# Patient Record
Sex: MALE | NOT HISPANIC OR LATINO | Employment: FULL TIME | URBAN - METROPOLITAN AREA
[De-identification: names, ages, dates, MRNs, and addresses within clinical notes are randomized per-mention and may not be internally consistent; named-entity substitution may affect disease eponyms.]

---

## 2020-01-10 ENCOUNTER — TRANSCRIBE ORDERS (OUTPATIENT)
Dept: PHYSICAL THERAPY | Facility: CLINIC | Age: 35
End: 2020-01-10

## 2020-01-10 DIAGNOSIS — S56.911A FOREARM STRAIN, RIGHT, INITIAL ENCOUNTER: Primary | ICD-10-CM

## 2020-01-10 DIAGNOSIS — S66.911A STRAIN OF RIGHT WRIST, INITIAL ENCOUNTER: ICD-10-CM

## 2020-01-15 ENCOUNTER — TREATMENT (OUTPATIENT)
Dept: PHYSICAL THERAPY | Facility: CLINIC | Age: 35
End: 2020-01-15

## 2020-01-15 DIAGNOSIS — M25.531 RIGHT WRIST PAIN: Primary | ICD-10-CM

## 2020-01-15 PROCEDURE — 97035 APP MDLTY 1+ULTRASOUND EA 15: CPT | Performed by: PHYSICAL THERAPIST

## 2020-01-15 PROCEDURE — 97014 ELECTRIC STIMULATION THERAPY: CPT | Performed by: PHYSICAL THERAPIST

## 2020-01-15 PROCEDURE — 97162 PT EVAL MOD COMPLEX 30 MIN: CPT | Performed by: PHYSICAL THERAPIST

## 2020-01-15 PROCEDURE — 97110 THERAPEUTIC EXERCISES: CPT | Performed by: PHYSICAL THERAPIST

## 2020-01-15 NOTE — PROGRESS NOTES
Physical Therapy Initial Evaluation and Plan of Care    TOTAL TIME: 90 MINUTES    Subjective Evaluation    History of Present Illness  Mechanism of injury: Patient reports right wrist pain that started at work about one month ago; does not recall any specific incident     Points to ulnar side of wrist as well as the snuffbox area on the lateral side    Complains of swelling in right wrist    Uses ibuprofen and gentle massages at home ; no heat or ice       Brace helps some    Hurts most to ulnarly deviate or /lift     PMH:   Knee pain, osgood-schlaters, meniscus injury, arthritis in knees, right side low back injury, headaches      Quality of life: fair    Pain  Current pain ratin  At worst pain ratin  Quality: dull ache and sharp  Relieving factors: medications  Aggravating factors: movement, lifting and repetitive movement  Progression: no change    Patient Goals  Patient goals for therapy: increased strength, independence with ADLs/IADLs, return to sport/leisure activities, return to work, increased motion, decreased pain and decreased edema             Objective       Observations     Right Wrist/Hand   Positive for edema.     Additional Observation Details  At wrist, just proximal to ulna: left 17.5 cm, right 18.5 cm  5 cm proximal to ulna: left 20 cm, right 21 cm        Palpation     Right Tenderness of the extensor carpi ulnaris and flexor carpi ulnaris.     Tenderness     Right Elbow   No tenderness in the distal biceps tendon, distal triceps tendon, lateral epicondyle, medial epicondyle and olecranon process.     Right Wrist/Hand   Tenderness in the sixth dorsal compartment and triangular fibrocartilage complex . Tenderness in the common extensor tendon, distal biceps tendon, distal triceps tendon, lateral epicondyle, medial epicondyle and olecranon process.     Additional Tenderness Details  Tender to palpate: right pec major/minor, right cervical paraspinals, cervical spinous processes , right  upper trapezius, supraspinatus     Neurological Testing     Sensation     Wrist/Hand   Left   Intact: light touch    Right   Intact: light touch  Paresthesia: light touch    Comments   Right light touch: c/o intermittent tingling in right wrist on ulnar side    Reflexes   Left   Biceps (C5/C6): trace (1+)  Brachioradialis (C6): trace (1+)  Triceps (C7): trace (1+)    Right   Biceps (C5/C6): trace (1+)  Brachioradialis (C6): trace (1+)  Triceps (C7): trace (1+)    Additional Neurological Details  Reflexes equal bilaterally     Active Range of Motion     Right Wrist   Wrist flexion: with pain  Wrist extension: with pain  Radial deviation: with pain  Ulnar deviation: with pain    Additional Active Range of Motion Details  ROM of right wrist limited ~ 50% all directions, due to pain along the ulnar side of forearm/ wrist     Strength/Myotome Testing     Left Wrist/Hand      (2nd hand position)     Trial 1: 90 lbs    Trial 2: 90 lbs    Trial 3: 90 lbs    Average: 90 lbs    Right Wrist/Hand   Wrist extension: 3-  Wrist flexion: 3-  Radial deviation: 3-  Ulnar deviation: 3-     (2nd hand position)     Trial 1: 30 lbs    Trial 2: 30 lbs    Trial 3: 30 lbs    Average: 30 lbs    Additional Strength Details  Rotator cuff strength WFL    Tests     Right Wrist/Hand   Positive Finkelstein's, Phalen's sign and TFCC load.   Negative Tinel's sign (medial nerve).     Additional Tests Details  + cervical compression test   + right O'Briens test  + Neer impingement test     General Comments     Wrist/Hand Comments  Tight/sore band of tissue along midline of cervical spine, possibly ligamentum flava, nuchal ligament, etc  States headaches are usually anterior or frontal          Assessment & Plan     Assessment  Impairments: abnormal muscle tone, abnormal or restricted ROM, activity intolerance, impaired physical strength, lacks appropriate home exercise program and pain with function  Assessment details: Patient presents today  with c/o right wrist pain predominately for ~ 1 month; upon examination patient also has significant dysfunction with right shoulder and right>left side of cervical spine;  is significantly decreased on the right (90# left, 30# right with pain), pain on right side of neck with all neck ROM except flexion; positive cervical compression test; reflexes equal bilaterally; c/o numbness and tingling intermittently in the right ulnar/dorsal wrist; positive Washington's test and pain with ADD and flexion of shoulder; limited forward flexion to ~ 120 degrees; RTC strength good and negative empty can test; patient states he had a work injury ~ 2 years ago to right side of lumbar spine which still gives him pain - patient states he feels like his right sided neck pain started hurting about the same time as that; patient has tenderness to palpate midline and right side of cervical spine ; taut band of tissue in midline cervical spine ; has difficulty sleeping especially on the right side; wrist pain is predominately on the ulnar side of wrist just proximal and medial to ulnar styloid process; he has one cm of edema at wrist and 5 cm proximal to wrist right compared to left ; he feels like the brace helps the wrist; is working on light duty, pulls back from duties that are causing pain; only mildly positive Finkelstein test today, primary complaints of wrist pain are on ulnar side of right wrist; differential diagnosis of right wrist sprain vs possible proximal component of cervical radiculitis chronic in nature; patient will perform physical therapy for manual therapy, modalities, therapeutic exercise in order to decrease pain and improve functional ROM and strength in order to RTW on full duty without pain or dysfunction   Prognosis: fair  Functional Limitations: carrying objects, lifting, sleeping, pulling, pushing, uncomfortable because of pain, moving in bed, reaching behind back, reaching overhead and unable to perform  repetitive tasks  Goals  Plan Goals: 4 weeks:  1. IND with HEP  2. Patient to display full, pain-free ROM of right wrist all planes  3. Patient to display right  strength > 75# without pain  4. Patient to display 5/5 strength of right UE in order to perform work duties without pain or dysfunction  5. Patient to have 50% reduction in complaints of cervical spine pain with improved ROM to WFL    Plan  Therapy options: will be seen for skilled physical therapy services  Planned modality interventions: iontophoresis, TENS, thermotherapy (hydrocollator packs), traction, ultrasound, high voltage pulsed current (pain management), electrical stimulation/Russian stimulation and cryotherapy  Planned therapy interventions: manual therapy, neuromuscular re-education, postural training, soft tissue mobilization, spinal/joint mobilization, strengthening, stretching, therapeutic activities, joint mobilization, home exercise program, functional ROM exercises, flexibility, fine motor coordination training, body mechanics training and balance/weight-bearing training  Frequency: 2x week  Duration in visits: 8  Treatment plan discussed with: patient        Manual Therapy:         mins  33163;  Therapeutic Exercise:    15     mins  85030;     Neuromuscular Berlin:        mins  97532;    Therapeutic Activity:          mins  52196;     Gait Training:           mins  00170;     Ultrasound:     10     mins  50602;    Electrical Stimulation:    15     mins  00758 ( );  Dry Needling          mins self-pay    Timed Treatment:   40   mins   Total Treatment:     90   mins    PT SIGNATURE: Dell Musa PT   DATE TREATMENT INITIATED: 1/15/2020    Initial Certification  Certification Period: 4/14/2020  I certify that the therapy services are furnished while this patient is under my care.  The services outlined above are required by this patient, and will be reviewed every 90 days.     PHYSICIAN: Ehsan Henriquez MD      DATE:      Please sign and return via fax to  .. Thank you, [facility].

## 2020-01-22 ENCOUNTER — TREATMENT (OUTPATIENT)
Dept: PHYSICAL THERAPY | Facility: CLINIC | Age: 35
End: 2020-01-22

## 2020-01-22 DIAGNOSIS — M25.531 RIGHT WRIST PAIN: Primary | ICD-10-CM

## 2020-01-22 PROCEDURE — 97110 THERAPEUTIC EXERCISES: CPT | Performed by: PHYSICAL THERAPIST

## 2020-01-22 PROCEDURE — 97014 ELECTRIC STIMULATION THERAPY: CPT | Performed by: PHYSICAL THERAPIST

## 2020-01-22 NOTE — PROGRESS NOTES
Physical Therapy Daily Progress Note    TOTAL TIME: 60 MINUTES    Zenon Sparrow reports: feels a whole lot better since that first PT session; hasn't really hurt much since then, occasional swelling in thumb area, has not hurt on medial side of wrist since last session      Objective   See Exercise, Manual, and Modality Logs for complete treatment.     THERAPEUTIC EXERCISES/ACTIVITIES ADDED TODAY: putty, digi web series, digi , digitube series    Ice with pre-mod estim to snuffbox area x 15 min    Assessment/Plan  PATIENT NEEDS CONTINUED PHYSICAL THERAPY IN ORDER TO ACHIEVE FULL ROM, STRENGTH AND FUNCTION WITH NO REPORTS OF PAIN IN ORDER TO RTW WITHOUT RESTRICTIONS AND WITHOUT PAIN OR DYSFUNCTION       Progress per Plan of Care           Manual Therapy:         mins  53327;  Therapeutic Exercise:    45     mins  43762;     Neuromuscular Berlin:        mins  49681;    Therapeutic Activity:          mins  11331;     Gait Training:           mins  15397;     Ultrasound:          mins  81770;    Electrical Stimulation:    15     mins  07038 ( );  Dry Needling          mins self-pay    Timed Treatment:   60   mins   Total Treatment:     60   mins    Dell Musa PT  Physical Therapist

## 2020-01-29 ENCOUNTER — TREATMENT (OUTPATIENT)
Dept: PHYSICAL THERAPY | Facility: CLINIC | Age: 35
End: 2020-01-29

## 2020-01-29 DIAGNOSIS — M25.531 RIGHT WRIST PAIN: Primary | ICD-10-CM

## 2020-01-29 PROCEDURE — 97014 ELECTRIC STIMULATION THERAPY: CPT | Performed by: PHYSICAL THERAPIST

## 2020-01-29 PROCEDURE — 97110 THERAPEUTIC EXERCISES: CPT | Performed by: PHYSICAL THERAPIST

## 2020-01-31 ENCOUNTER — TREATMENT (OUTPATIENT)
Dept: PHYSICAL THERAPY | Facility: CLINIC | Age: 35
End: 2020-01-31

## 2020-01-31 DIAGNOSIS — M25.531 RIGHT WRIST PAIN: Primary | ICD-10-CM

## 2020-01-31 PROCEDURE — 97110 THERAPEUTIC EXERCISES: CPT | Performed by: PHYSICAL THERAPIST

## 2020-01-31 NOTE — PROGRESS NOTES
Physical Therapy Daily Progress Note    TOTAL TIME: 80 MINUTES    Zenon Cross reports: wrist is feeling better, neck is feeling better as well       Objective   See Exercise, Manual, and Modality Logs for complete treatment.     THERAPEUTIC EXERCISES/ACTIVITIES ADDED TODAY: see flow sheets     Assessment/Plan  PATIENT NEEDS CONTINUED PHYSICAL THERAPY IN ORDER TO ACHIEVE FULL ROM, STRENGTH AND FUNCTION WITH NO REPORTS OF PAIN IN ORDER TO RTW WITHOUT RESTRICTIONS AND WITHOUT PAIN OR DYSFUNCTION ; MILD PAIN WITH BLUE DIGI TUBE TWISTING AND BENDING      Progress per Plan of Care           Manual Therapy:         mins  38142;  Therapeutic Exercise:    65     mins  28591;     Neuromuscular Berlin:        mins  48516;    Therapeutic Activity:          mins  56093;     Gait Training:           mins  47643;     Ultrasound:          mins  16992;    Electrical Stimulation:    15     mins  51364 ( );  Dry Needling          mins self-pay    Timed Treatment:   80   mins   Total Treatment:     80   mins    Dell Musa PT  Physical Therapist

## 2020-02-04 NOTE — PROGRESS NOTES
Physical Therapy Daily Progress Note    TOTAL TIME: 70 MINUTES    Zenon Cross reports: feeling a whole lot better, neck hurts less, too      Objective   See Exercise, Manual, and Modality Logs for complete treatment.     THERAPEUTIC EXERCISES/ACTIVITIES ADDED TODAY: see flow sheets ; blue digi tube, black digi web    Assessment/Plan  Some mild wrist pain with twisting, otherwise patient is doing much better overall    Progress per Plan of Care and Progress strengthening /stabilization /functional activity           Manual Therapy:         mins  26059;  Therapeutic Exercise:    55     mins  85649;     Neuromuscular Berlin:        mins  84631;    Therapeutic Activity:          mins  73126;     Gait Training:           mins  24347;     Ultrasound:          mins  21118;    Electrical Stimulation:    15     mins  09393 ( );  Dry Needling          mins self-pay    Timed Treatment:   70   mins   Total Treatment:     70   mins    Dell Musa, PT  Physical Therapist

## 2021-12-24 ENCOUNTER — HOSPITAL ENCOUNTER (EMERGENCY)
Facility: HOSPITAL | Age: 36
Discharge: HOME | End: 2021-12-24
Attending: EMERGENCY MEDICINE | Admitting: EMERGENCY MEDICINE

## 2021-12-24 VITALS
WEIGHT: 205 LBS | SYSTOLIC BLOOD PRESSURE: 161 MMHG | DIASTOLIC BLOOD PRESSURE: 96 MMHG | OXYGEN SATURATION: 95 % | HEIGHT: 71 IN | HEART RATE: 85 BPM | TEMPERATURE: 98.8 F | BODY MASS INDEX: 28.7 KG/M2 | RESPIRATION RATE: 16 BRPM
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
## 2021-12-24 PROCEDURE — 99282 EMERGENCY DEPT VISIT SF MDM: CPT

## 2021-12-24 NOTE — DISCHARGE INSTRUCTIONS
Continue strict skin care to your healing wounds.  Continue the sulfamethoxazole/Bactrim and Augmentin antibiotics as prescribed for prevention infection.  Follow-up with animal control in [place] as we discussed.  Return to the emergency department as needed for worsening symptoms or concerns.  Thank you Laura Saha

## 2021-12-26 NOTE — ED PROVIDER NOTES
EMERGENCY DEPARTMENT ENCOUNTER    Pt Name: Zenon Sparrow  MRN: [id number]  Pt :   1985  Room Number:  26SF/26  Date of encounter:  2021  PCP: Provider, No Known  ED Provider: RUKHSANA Lim    Historian: patient       HPI:  Chief Complaint: dog bite        Context: Zenon Sparrow is a 36 y.o. male who presents to the ED c/o concern that he may need rabies immunization.  Patient states he sustained bites on  from a dog approximately 1 week ago while out of town.  The dog was a pitbull mix seen at a hotel.  He sustained bites to his left knee area and right hand.  He sought care at a local urgent treatment center.  Police were called and the dog was placed on in quarantine with the owner who produced evidence of immunization for rabies although the immunizations were .  He is currently taking Augmentin as well as Bactrim for prophylaxis.  His wounds are healing.      PAST MEDICAL HISTORY  Past Medical History:   Diagnosis Date   • Arthritis    • Headache    • Hypertension    • Injury of back          PAST SURGICAL HISTORY  No past surgical history on file.      FAMILY HISTORY  No family history on file.      SOCIAL HISTORY  Social History     Socioeconomic History   • Marital status:    Tobacco Use   • Smoking status: Current Every Day Smoker     Packs/day: 1.50     Years: 18.00     Pack years: 27.00     Types: Cigarettes   Substance and Sexual Activity   • Alcohol use: No   • Drug use: Defer   • Sexual activity: Defer         ALLERGIES  Patient has no known allergies.        REVIEW OF SYSTEMS  Review of Systems     All systems reviewed and negative except for those discussed in HPI.       PHYSICAL EXAM    I have reviewed the triage vital signs and nursing notes.    ED Triage Vitals [21 1515]   Temp Heart Rate Resp BP SpO2   98.8 °F (37.1 °C) 85 16 161/96 95 %      Temp src Heart Rate Source Patient Position BP Location FiO2 (%)   Oral Monitor Sitting Left arm --        Physical Exam  GENERAL:   Appears well.  He is a good historian.  His vital signs are normal  HENT: Nares patent.  EYES: No scleral icterus.  CV: Regular rhythm, regular rate.  RESPIRATORY: Normal effort.  No audible wheezes, rales or rhonchi.  ABDOMEN: Soft, nontender  MUSCULOSKELETAL: No deformities.   NEURO: Alert, moves all extremities, follows commands.  SKIN: Warm, dry, no rash visualized.        LAB RESULTS  No results found for this or any previous visit (from the past 24 hour(s)).    If labs were ordered, I independently reviewed the results.        RADIOLOGY  No Radiology Exams Resulted Within Past 24 Hours        PROCEDURES    Procedures    No orders to display       MEDICATIONS GIVEN IN ER    Medications - No data to display        ED Course as of 12/26/21 1642   Fri Dec 24, 2021   1609 Patient has opted to forego rabies immunization today and will follow up with animal control until further notice [MS]      ED Course User Index  [MS] Renetta Collins APRN             AS OF 16:42 EST VITALS:    BP - 161/96  HR - 85  TEMP - 98.8 °F (37.1 °C) (Oral)  O2 SATS - 95%                  DIAGNOSIS  Final diagnoses:   Dog bite, initial encounter         DISPOSITION    Discharged.            Renetta Collins APRN  12/26/21 1642

## 2022-12-28 ENCOUNTER — HOSPITAL ENCOUNTER (OUTPATIENT)
Dept: GENERAL RADIOLOGY | Facility: HOSPITAL | Age: 37
Discharge: HOME | End: 2022-12-28
Payer: OTHER MISCELLANEOUS

## 2022-12-28 ENCOUNTER — HOSPITAL ENCOUNTER (OUTPATIENT)
Dept: GENERAL RADIOLOGY | Facility: HOSPITAL | Age: 37
End: 2022-12-28
Payer: OTHER MISCELLANEOUS

## 2022-12-28 ENCOUNTER — PRE-ADMISSION TESTING (OUTPATIENT)
Dept: PREADMISSION TESTING | Facility: HOSPITAL | Age: 37
End: 2022-12-28
Payer: OTHER MISCELLANEOUS

## 2022-12-28 VITALS — HEIGHT: 71 IN | BODY MASS INDEX: 32.87 KG/M2 | WEIGHT: 234.79 LBS

## 2022-12-28 PROCEDURE — 93010 ELECTROCARDIOGRAM REPORT: CPT | Performed by: INTERNAL MEDICINE

## 2022-12-28 PROCEDURE — 93005 ELECTROCARDIOGRAM TRACING: CPT

## 2022-12-28 PROCEDURE — 71046 X-RAY EXAM CHEST 2 VIEWS: CPT

## 2022-12-28 NOTE — PAT
An arrival time for procedure was not provided during PAT visit. If patient had any questions or concerns about their arrival time, they were instructed to contact their surgeon/physician.  Additionally, if the patient referred to an arrival time that was acquired from their my chart account, patient was encouraged to verify that time with their surgeon/physician. Arrival times are NOT provided in Pre Admission Testing Department.    Patient instructed to drink 20 ounces of Gatorade and it needs to be completed 1 hour (for Main OR patients) or 2 hours (scheduled  section & [facility] patients) before given arrival time for procedure (NO RED Gatorade)    Patient verbalized understanding.    PATIENT RECEIVED INSTRUCITONS ON HOW TO AND WHEN TO USE BENZOYL WASH. PATIENT STATES THAT HE WILL  THE WASH FROM HIS PHARMACY.     Patient denies any current skin issues.     Patient directed to Radiology Department for CXR after Pre Admission Testing Appointment.     PATIENT EKG ON CHART AND IN Epic FROM 2022    PATIENT STATES THAT HE IS NOT CURRENTLY ABLE TO WORK AT THIS TIME. HE SAYS THAT HE IS DEPENDANT UPON MONEY THAT HE GETS FROM DONATING PLASMA. HE HAS AN APPT TODAY TO DONATE PLASMA AND STATES THAT HE NEEDS TO COME BACK TO HAVE LABS DRAWN FOR HIS UPCOMING SURGERY. PATIENT WAS GIVEN DIRECTIONS TO THE LAB AND IS EXPECTED TO COME BACK ON 2022 TO THE MAIN LAB TO HAVE LABS OBTAINED FOR UPCOMING SURGERY SCHEDULED FOR 2023. LEFT MESSAGE FOR [facility] WITH DR MCKINLEY'S OFFICE TO INFORM THEM.

## 2022-12-29 ENCOUNTER — LAB (OUTPATIENT)
Dept: LAB | Facility: HOSPITAL | Age: 37
End: 2022-12-29
Payer: OTHER MISCELLANEOUS

## 2022-12-29 LAB
ANION GAP SERPL CALCULATED.3IONS-SCNC: 6 MMOL/L (ref 5–15)
BUN SERPL-MCNC: 15 MG/DL (ref 6–20)
BUN/CREAT SERPL: 18.8 (ref 7–25)
CALCIUM SPEC-SCNC: 8.8 MG/DL (ref 8.6–10.5)
CHLORIDE SERPL-SCNC: 102 MMOL/L (ref 98–107)
CO2 SERPL-SCNC: 31 MMOL/L (ref 22–29)
CREAT SERPL-MCNC: 0.8 MG/DL (ref 0.76–1.27)
DEPRECATED RDW RBC AUTO: 43.3 FL (ref 37–54)
EGFRCR SERPLBLD CKD-EPI 2021: 115.5 ML/MIN/1.73
ERYTHROCYTE [DISTWIDTH] IN BLOOD BY AUTOMATED COUNT: 11.5 % (ref 12.3–15.4)
GLUCOSE SERPL-MCNC: 103 MG/DL (ref 65–99)
HBA1C MFR BLD: 4.8 % (ref 4.8–5.6)
HCT VFR BLD AUTO: 50.5 % (ref 37.5–51)
HGB BLD-MCNC: 17.4 G/DL (ref 13–17.7)
MCH RBC QN AUTO: 35 PG (ref 26.6–33)
MCHC RBC AUTO-ENTMCNC: 34.5 G/DL (ref 31.5–35.7)
MCV RBC AUTO: 101.6 FL (ref 79–97)
PLATELET # BLD AUTO: 150 10*3/MM3 (ref 140–450)
PMV BLD AUTO: 8.3 FL (ref 6–12)
POTASSIUM SERPL-SCNC: 4.6 MMOL/L (ref 3.5–5.2)
QT INTERVAL: 356 MS
QTC INTERVAL: 418 MS
RBC # BLD AUTO: 4.97 10*6/MM3 (ref 4.14–5.8)
SODIUM SERPL-SCNC: 139 MMOL/L (ref 136–145)
WBC NRBC COR # BLD: 8.3 10*3/MM3 (ref 3.4–10.8)

## 2022-12-29 PROCEDURE — 80048 BASIC METABOLIC PNL TOTAL CA: CPT

## 2022-12-29 PROCEDURE — 83036 HEMOGLOBIN GLYCOSYLATED A1C: CPT

## 2022-12-29 PROCEDURE — 36415 COLL VENOUS BLD VENIPUNCTURE: CPT

## 2022-12-29 PROCEDURE — 85027 COMPLETE CBC AUTOMATED: CPT

## 2023-01-31 ENCOUNTER — ANESTHESIA EVENT (OUTPATIENT)
Dept: PERIOP | Facility: HOSPITAL | Age: 38
End: 2023-01-31

## 2023-01-31 ENCOUNTER — APPOINTMENT (OUTPATIENT)
Dept: GENERAL RADIOLOGY | Facility: HOSPITAL | Age: 38
End: 2023-01-31
Payer: OTHER MISCELLANEOUS

## 2023-01-31 ENCOUNTER — ANESTHESIA (OUTPATIENT)
Dept: PERIOP | Facility: HOSPITAL | Age: 38
End: 2023-01-31

## 2023-01-31 ENCOUNTER — HOSPITAL ENCOUNTER (OUTPATIENT)
Facility: HOSPITAL | Age: 38
Discharge: HOME | End: 2023-02-01
Attending: ORTHOPAEDIC SURGERY | Admitting: ORTHOPAEDIC SURGERY
Payer: OTHER MISCELLANEOUS

## 2023-01-31 DIAGNOSIS — S29.011A PECTORAL MUSCLE RUPTURE, INITIAL ENCOUNTER: Primary | ICD-10-CM

## 2023-01-31 PROBLEM — Z72.0 TOBACCO ABUSE: Status: ACTIVE | Noted: 2023-01-31

## 2023-01-31 PROBLEM — E66.9 OBESITY: Status: ACTIVE | Noted: 2023-01-31

## 2023-01-31 PROBLEM — Z98.890 POSTOPERATIVE HYPOXIA: Status: ACTIVE | Noted: 2023-01-31

## 2023-01-31 PROBLEM — R09.02 POSTOPERATIVE HYPOXIA: Status: ACTIVE | Noted: 2023-01-31

## 2023-01-31 PROCEDURE — 94799 UNLISTED PULMONARY SVC/PX: CPT

## 2023-01-31 PROCEDURE — 25010000002 DEXAMETHASONE SODIUM PHOSPHATE 10 MG/ML SOLUTION: Performed by: NURSE ANESTHETIST, CERTIFIED REGISTERED

## 2023-01-31 PROCEDURE — 25010000002 ONDANSETRON PER 1 MG: Performed by: NURSE ANESTHETIST, CERTIFIED REGISTERED

## 2023-01-31 PROCEDURE — 25010000002 FENTANYL CITRATE (PF) 50 MCG/ML SOLUTION

## 2023-01-31 PROCEDURE — 25010000002 BUPRENORPHINE PER 0.1 MG: Performed by: NURSE ANESTHETIST, CERTIFIED REGISTERED

## 2023-01-31 PROCEDURE — 25010000002 MIDAZOLAM PER 1 MG: Performed by: NURSE ANESTHETIST, CERTIFIED REGISTERED

## 2023-01-31 PROCEDURE — C1713 ANCHOR/SCREW BN/BN,TIS/BN: HCPCS | Performed by: ORTHOPAEDIC SURGERY

## 2023-01-31 PROCEDURE — 94761 N-INVAS EAR/PLS OXIMETRY MLT: CPT

## 2023-01-31 PROCEDURE — 25010000002 FENTANYL CITRATE (PF) 50 MCG/ML SOLUTION: Performed by: NURSE ANESTHETIST, CERTIFIED REGISTERED

## 2023-01-31 PROCEDURE — 25010000002 ROPIVACAINE PER 1 MG: Performed by: NURSE ANESTHETIST, CERTIFIED REGISTERED

## 2023-01-31 PROCEDURE — 71045 X-RAY EXAM CHEST 1 VIEW: CPT

## 2023-01-31 PROCEDURE — 25010000002 DEXAMETHASONE PER 1 MG: Performed by: NURSE ANESTHETIST, CERTIFIED REGISTERED

## 2023-01-31 PROCEDURE — 25010000002 CEFAZOLIN IN DEXTROSE 2-4 GM/100ML-% SOLUTION: Performed by: ORTHOPAEDIC SURGERY

## 2023-01-31 PROCEDURE — 25010000002 PROPOFOL 10 MG/ML EMULSION: Performed by: NURSE ANESTHETIST, CERTIFIED REGISTERED

## 2023-01-31 DEVICE — SUT/ANCH GRYPHON/P BR W/DYNACORD: Type: IMPLANTABLE DEVICE | Site: HUMERUS | Status: FUNCTIONAL

## 2023-01-31 RX ORDER — IPRATROPIUM BROMIDE AND ALBUTEROL SULFATE 2.5; .5 MG/3ML; MG/3ML
SOLUTION RESPIRATORY (INHALATION)
Status: COMPLETED
Start: 2023-01-31 | End: 2023-01-31

## 2023-01-31 RX ORDER — HYDROCODONE BITARTRATE AND ACETAMINOPHEN 7.5; 325 MG/1; MG/1
1-2 TABLET ORAL EVERY 4 HOURS PRN
Qty: 24 TABLET | Refills: 0 | Status: SHIPPED | OUTPATIENT
Start: 2023-01-31

## 2023-01-31 RX ORDER — HYDROCODONE BITARTRATE AND ACETAMINOPHEN 7.5; 325 MG/1; MG/1
1 TABLET ORAL EVERY 4 HOURS PRN
Status: DISCONTINUED | OUTPATIENT
Start: 2023-01-31 | End: 2023-02-01 | Stop reason: HOSPADM

## 2023-01-31 RX ORDER — HYDROMORPHONE HYDROCHLORIDE 1 MG/ML
0.5 INJECTION, SOLUTION INTRAMUSCULAR; INTRAVENOUS; SUBCUTANEOUS
Status: DISCONTINUED | OUTPATIENT
Start: 2023-01-31 | End: 2023-02-01 | Stop reason: HOSPADM

## 2023-01-31 RX ORDER — SODIUM CHLORIDE, SODIUM LACTATE, POTASSIUM CHLORIDE, CALCIUM CHLORIDE 600; 310; 30; 20 MG/100ML; MG/100ML; MG/100ML; MG/100ML
9 INJECTION, SOLUTION INTRAVENOUS CONTINUOUS PRN
Status: DISCONTINUED | OUTPATIENT
Start: 2023-01-31 | End: 2023-02-01 | Stop reason: HOSPADM

## 2023-01-31 RX ORDER — OXYCODONE HYDROCHLORIDE 5 MG/1
5 TABLET ORAL ONCE AS NEEDED
Status: DISCONTINUED | OUTPATIENT
Start: 2023-01-31 | End: 2023-01-31 | Stop reason: HOSPADM

## 2023-01-31 RX ORDER — ROCURONIUM BROMIDE 10 MG/ML
INJECTION, SOLUTION INTRAVENOUS AS NEEDED
Status: DISCONTINUED | OUTPATIENT
Start: 2023-01-31 | End: 2023-01-31 | Stop reason: SURG

## 2023-01-31 RX ORDER — SODIUM CHLORIDE 0.9 % (FLUSH) 0.9 %
10 SYRINGE (ML) INJECTION EVERY 12 HOURS SCHEDULED
Status: DISCONTINUED | OUTPATIENT
Start: 2023-01-31 | End: 2023-01-31 | Stop reason: HOSPADM

## 2023-01-31 RX ORDER — MELOXICAM 7.5 MG/1
15 TABLET ORAL DAILY
Status: DISCONTINUED | OUTPATIENT
Start: 2023-02-01 | End: 2023-02-01 | Stop reason: HOSPADM

## 2023-01-31 RX ORDER — DOCUSATE SODIUM 100 MG/1
100 CAPSULE, LIQUID FILLED ORAL 2 TIMES DAILY
Status: DISCONTINUED | OUTPATIENT
Start: 2023-01-31 | End: 2023-02-01 | Stop reason: HOSPADM

## 2023-01-31 RX ORDER — ONDANSETRON 4 MG/1
4 TABLET, FILM COATED ORAL EVERY 8 HOURS PRN
Qty: 12 TABLET | Refills: 0 | Status: SHIPPED | OUTPATIENT
Start: 2023-01-31

## 2023-01-31 RX ORDER — LIDOCAINE HYDROCHLORIDE 10 MG/ML
0.5 INJECTION, SOLUTION EPIDURAL; INFILTRATION; INTRACAUDAL; PERINEURAL ONCE AS NEEDED
Status: COMPLETED | OUTPATIENT
Start: 2023-01-31 | End: 2023-01-31

## 2023-01-31 RX ORDER — ONDANSETRON 2 MG/ML
4 INJECTION INTRAMUSCULAR; INTRAVENOUS ONCE AS NEEDED
Status: DISCONTINUED | OUTPATIENT
Start: 2023-01-31 | End: 2023-01-31 | Stop reason: HOSPADM

## 2023-01-31 RX ORDER — SODIUM CHLORIDE, SODIUM LACTATE, POTASSIUM CHLORIDE, CALCIUM CHLORIDE 600; 310; 30; 20 MG/100ML; MG/100ML; MG/100ML; MG/100ML
INJECTION, SOLUTION INTRAVENOUS CONTINUOUS PRN
Status: DISCONTINUED | OUTPATIENT
Start: 2023-01-31 | End: 2023-01-31 | Stop reason: SURG

## 2023-01-31 RX ORDER — FENTANYL CITRATE 50 UG/ML
INJECTION, SOLUTION INTRAMUSCULAR; INTRAVENOUS
Status: COMPLETED
Start: 2023-01-31 | End: 2023-01-31

## 2023-01-31 RX ORDER — PREGABALIN 75 MG/1
75 CAPSULE ORAL ONCE
Status: COMPLETED | OUTPATIENT
Start: 2023-01-31 | End: 2023-01-31

## 2023-01-31 RX ORDER — LISINOPRIL 10 MG/1
10 TABLET ORAL
Status: DISCONTINUED | OUTPATIENT
Start: 2023-01-31 | End: 2023-02-01 | Stop reason: HOSPADM

## 2023-01-31 RX ORDER — ACETAMINOPHEN 325 MG/1
325 TABLET ORAL EVERY 6 HOURS
Status: DISCONTINUED | OUTPATIENT
Start: 2023-01-31 | End: 2023-02-01 | Stop reason: HOSPADM

## 2023-01-31 RX ORDER — ROPIVACAINE HYDROCHLORIDE 2 MG/ML
INJECTION, SOLUTION EPIDURAL; INFILTRATION; PERINEURAL CONTINUOUS
Status: DISCONTINUED | OUTPATIENT
Start: 2023-01-31 | End: 2023-02-01 | Stop reason: HOSPADM

## 2023-01-31 RX ORDER — ALBUTEROL SULFATE 90 UG/1
AEROSOL, METERED RESPIRATORY (INHALATION) AS NEEDED
Status: DISCONTINUED | OUTPATIENT
Start: 2023-01-31 | End: 2023-01-31 | Stop reason: SURG

## 2023-01-31 RX ORDER — BUPIVACAINE HYDROCHLORIDE 2.5 MG/ML
INJECTION, SOLUTION EPIDURAL; INFILTRATION; INTRACAUDAL
Status: COMPLETED | OUTPATIENT
Start: 2023-01-31 | End: 2023-01-31

## 2023-01-31 RX ORDER — FAMOTIDINE 20 MG/1
20 TABLET, FILM COATED ORAL
Status: COMPLETED | OUTPATIENT
Start: 2023-01-31 | End: 2023-01-31

## 2023-01-31 RX ORDER — DEXAMETHASONE SODIUM PHOSPHATE 4 MG/ML
INJECTION, SOLUTION INTRA-ARTICULAR; INTRALESIONAL; INTRAMUSCULAR; INTRAVENOUS; SOFT TISSUE AS NEEDED
Status: DISCONTINUED | OUTPATIENT
Start: 2023-01-31 | End: 2023-01-31 | Stop reason: SURG

## 2023-01-31 RX ORDER — SODIUM CHLORIDE 0.9 % (FLUSH) 0.9 %
10 SYRINGE (ML) INJECTION AS NEEDED
Status: DISCONTINUED | OUTPATIENT
Start: 2023-01-31 | End: 2023-01-31 | Stop reason: HOSPADM

## 2023-01-31 RX ORDER — LIDOCAINE HYDROCHLORIDE 10 MG/ML
INJECTION, SOLUTION EPIDURAL; INFILTRATION; INTRACAUDAL; PERINEURAL AS NEEDED
Status: DISCONTINUED | OUTPATIENT
Start: 2023-01-31 | End: 2023-01-31 | Stop reason: SURG

## 2023-01-31 RX ORDER — EPHEDRINE SULFATE 50 MG/ML
5 INJECTION, SOLUTION INTRAVENOUS ONCE AS NEEDED
Status: DISCONTINUED | OUTPATIENT
Start: 2023-01-31 | End: 2023-01-31 | Stop reason: HOSPADM

## 2023-01-31 RX ORDER — MIDAZOLAM HYDROCHLORIDE 1 MG/ML
INJECTION INTRAMUSCULAR; INTRAVENOUS
Status: COMPLETED | OUTPATIENT
Start: 2023-01-31 | End: 2023-01-31

## 2023-01-31 RX ORDER — ACETAMINOPHEN 650 MG/1
650 SUPPOSITORY RECTAL ONCE AS NEEDED
Status: DISCONTINUED | OUTPATIENT
Start: 2023-01-31 | End: 2023-01-31 | Stop reason: HOSPADM

## 2023-01-31 RX ORDER — ONDANSETRON 2 MG/ML
INJECTION INTRAMUSCULAR; INTRAVENOUS AS NEEDED
Status: DISCONTINUED | OUTPATIENT
Start: 2023-01-31 | End: 2023-01-31 | Stop reason: SURG

## 2023-01-31 RX ORDER — IPRATROPIUM BROMIDE AND ALBUTEROL SULFATE 2.5; .5 MG/3ML; MG/3ML
3 SOLUTION RESPIRATORY (INHALATION)
Status: DISCONTINUED | OUTPATIENT
Start: 2023-01-31 | End: 2023-02-01 | Stop reason: HOSPADM

## 2023-01-31 RX ORDER — ONDANSETRON 2 MG/ML
4 INJECTION INTRAMUSCULAR; INTRAVENOUS EVERY 6 HOURS PRN
Status: DISCONTINUED | OUTPATIENT
Start: 2023-01-31 | End: 2023-02-01 | Stop reason: HOSPADM

## 2023-01-31 RX ORDER — FENTANYL CITRATE 50 UG/ML
50 INJECTION, SOLUTION INTRAMUSCULAR; INTRAVENOUS
Status: DISCONTINUED | OUTPATIENT
Start: 2023-01-31 | End: 2023-01-31 | Stop reason: HOSPADM

## 2023-01-31 RX ORDER — DEXAMETHASONE SODIUM PHOSPHATE 10 MG/ML
INJECTION, SOLUTION INTRAMUSCULAR; INTRAVENOUS
Status: COMPLETED | OUTPATIENT
Start: 2023-01-31 | End: 2023-01-31

## 2023-01-31 RX ORDER — DOCUSATE SODIUM 100 MG/1
100 CAPSULE, LIQUID FILLED ORAL 2 TIMES DAILY
Qty: 20 CAPSULE | Refills: 0 | Status: SHIPPED | OUTPATIENT
Start: 2023-01-31

## 2023-01-31 RX ORDER — MELOXICAM 15 MG/1
15 TABLET ORAL ONCE
Status: COMPLETED | OUTPATIENT
Start: 2023-01-31 | End: 2023-01-31

## 2023-01-31 RX ORDER — ACETAMINOPHEN 500 MG
1000 TABLET ORAL ONCE
Status: COMPLETED | OUTPATIENT
Start: 2023-01-31 | End: 2023-01-31

## 2023-01-31 RX ORDER — SODIUM CHLORIDE 9 MG/ML
40 INJECTION, SOLUTION INTRAVENOUS AS NEEDED
Status: DISCONTINUED | OUTPATIENT
Start: 2023-01-31 | End: 2023-01-31 | Stop reason: HOSPADM

## 2023-01-31 RX ORDER — IPRATROPIUM BROMIDE AND ALBUTEROL SULFATE 2.5; .5 MG/3ML; MG/3ML
3 SOLUTION RESPIRATORY (INHALATION)
Status: DISCONTINUED | OUTPATIENT
Start: 2023-01-31 | End: 2023-01-31 | Stop reason: HOSPADM

## 2023-01-31 RX ORDER — FENTANYL CITRATE 50 UG/ML
50 INJECTION, SOLUTION INTRAMUSCULAR; INTRAVENOUS
Status: DISCONTINUED | OUTPATIENT
Start: 2023-01-31 | End: 2023-01-31

## 2023-01-31 RX ORDER — BUPRENORPHINE HYDROCHLORIDE 0.32 MG/ML
INJECTION INTRAMUSCULAR; INTRAVENOUS
Status: COMPLETED | OUTPATIENT
Start: 2023-01-31 | End: 2023-01-31

## 2023-01-31 RX ORDER — ACETAMINOPHEN 325 MG/1
650 TABLET ORAL ONCE AS NEEDED
Status: DISCONTINUED | OUTPATIENT
Start: 2023-01-31 | End: 2023-01-31 | Stop reason: HOSPADM

## 2023-01-31 RX ORDER — ONDANSETRON 4 MG/1
4 TABLET, FILM COATED ORAL EVERY 6 HOURS PRN
Status: DISCONTINUED | OUTPATIENT
Start: 2023-01-31 | End: 2023-02-01 | Stop reason: HOSPADM

## 2023-01-31 RX ORDER — FENTANYL CITRATE 50 UG/ML
INJECTION, SOLUTION INTRAMUSCULAR; INTRAVENOUS
Status: COMPLETED | OUTPATIENT
Start: 2023-01-31 | End: 2023-01-31

## 2023-01-31 RX ORDER — MIDAZOLAM HYDROCHLORIDE 1 MG/ML
1 INJECTION INTRAMUSCULAR; INTRAVENOUS
Status: DISCONTINUED | OUTPATIENT
Start: 2023-01-31 | End: 2023-01-31 | Stop reason: HOSPADM

## 2023-01-31 RX ORDER — CEFAZOLIN SODIUM 2 G/100ML
2 INJECTION, SOLUTION INTRAVENOUS ONCE
Status: COMPLETED | OUTPATIENT
Start: 2023-01-31 | End: 2023-01-31

## 2023-01-31 RX ORDER — PROPOFOL 10 MG/ML
VIAL (ML) INTRAVENOUS AS NEEDED
Status: DISCONTINUED | OUTPATIENT
Start: 2023-01-31 | End: 2023-01-31 | Stop reason: SURG

## 2023-01-31 RX ORDER — NALOXONE HCL 0.4 MG/ML
0.1 VIAL (ML) INJECTION
Status: DISCONTINUED | OUTPATIENT
Start: 2023-01-31 | End: 2023-02-01 | Stop reason: HOSPADM

## 2023-01-31 RX ORDER — CEFAZOLIN SODIUM 2 G/100ML
2 INJECTION, SOLUTION INTRAVENOUS EVERY 8 HOURS
Status: COMPLETED | OUTPATIENT
Start: 2023-01-31 | End: 2023-02-01

## 2023-01-31 RX ORDER — MAGNESIUM HYDROXIDE 1200 MG/15ML
LIQUID ORAL AS NEEDED
Status: DISCONTINUED | OUTPATIENT
Start: 2023-01-31 | End: 2023-01-31 | Stop reason: HOSPADM

## 2023-01-31 RX ORDER — LABETALOL HYDROCHLORIDE 5 MG/ML
5 INJECTION, SOLUTION INTRAVENOUS
Status: DISCONTINUED | OUTPATIENT
Start: 2023-01-31 | End: 2023-01-31 | Stop reason: HOSPADM

## 2023-01-31 RX ORDER — ESMOLOL HYDROCHLORIDE 10 MG/ML
INJECTION INTRAVENOUS AS NEEDED
Status: DISCONTINUED | OUTPATIENT
Start: 2023-01-31 | End: 2023-01-31 | Stop reason: SURG

## 2023-01-31 RX ADMIN — ESMOLOL HYDROCHLORIDE 20 MG: 10 INJECTION, SOLUTION INTRAVENOUS at 11:04

## 2023-01-31 RX ADMIN — PROPOFOL 200 MG: 10 INJECTION, EMULSION INTRAVENOUS at 10:20

## 2023-01-31 RX ADMIN — ROCURONIUM BROMIDE 50 MG: 10 INJECTION INTRAVENOUS at 10:20

## 2023-01-31 RX ADMIN — MELOXICAM 15 MG: 15 TABLET ORAL at 08:22

## 2023-01-31 RX ADMIN — ACETAMINOPHEN 325MG 325 MG: 325 TABLET ORAL at 23:06

## 2023-01-31 RX ADMIN — IPRATROPIUM BROMIDE AND ALBUTEROL SULFATE 3 ML: .5; 3 SOLUTION RESPIRATORY (INHALATION) at 13:48

## 2023-01-31 RX ADMIN — SUGAMMADEX 400 MG: 100 INJECTION, SOLUTION INTRAVENOUS at 11:28

## 2023-01-31 RX ADMIN — Medication 1000 MG: at 11:43

## 2023-01-31 RX ADMIN — LIDOCAINE HYDROCHLORIDE 0.2 ML: 10 INJECTION, SOLUTION EPIDURAL; INFILTRATION; INTRACAUDAL; PERINEURAL at 08:13

## 2023-01-31 RX ADMIN — ACETAMINOPHEN 1000 MG: 500 TABLET ORAL at 08:22

## 2023-01-31 RX ADMIN — ALBUTEROL SULFATE 10 PUFF: 90 AEROSOL, METERED RESPIRATORY (INHALATION) at 10:27

## 2023-01-31 RX ADMIN — FAMOTIDINE 20 MG: 20 TABLET, FILM COATED ORAL at 08:22

## 2023-01-31 RX ADMIN — IPRATROPIUM BROMIDE AND ALBUTEROL SULFATE 3 ML: .5; 3 SOLUTION RESPIRATORY (INHALATION) at 19:37

## 2023-01-31 RX ADMIN — FENTANYL CITRATE 50 MCG: 50 INJECTION, SOLUTION INTRAMUSCULAR; INTRAVENOUS at 12:16

## 2023-01-31 RX ADMIN — Medication 2 G: at 18:28

## 2023-01-31 RX ADMIN — LISINOPRIL 10 MG: 10 TABLET ORAL at 18:27

## 2023-01-31 RX ADMIN — CEFAZOLIN SODIUM 2 G: 2 INJECTION, SOLUTION INTRAVENOUS at 10:18

## 2023-01-31 RX ADMIN — PREGABALIN 75 MG: 75 CAPSULE ORAL at 08:22

## 2023-01-31 RX ADMIN — FENTANYL CITRATE 100 MCG: 50 INJECTION, SOLUTION INTRAMUSCULAR; INTRAVENOUS at 08:01

## 2023-01-31 RX ADMIN — DEXAMETHASONE SODIUM PHOSPHATE 8 MG: 4 INJECTION, SOLUTION INTRAMUSCULAR; INTRAVENOUS at 11:07

## 2023-01-31 RX ADMIN — SODIUM CHLORIDE, POTASSIUM CHLORIDE, SODIUM LACTATE AND CALCIUM CHLORIDE: 600; 310; 30; 20 INJECTION, SOLUTION INTRAVENOUS at 10:16

## 2023-01-31 RX ADMIN — ACETAMINOPHEN 325MG 325 MG: 325 TABLET ORAL at 18:27

## 2023-01-31 RX ADMIN — MIDAZOLAM HYDROCHLORIDE 2 MG: 1 INJECTION, SOLUTION INTRAMUSCULAR; INTRAVENOUS at 08:01

## 2023-01-31 RX ADMIN — BUPIVACAINE HYDROCHLORIDE 15 ML: 2.5 INJECTION, SOLUTION EPIDURAL; INFILTRATION; INTRACAUDAL at 08:01

## 2023-01-31 RX ADMIN — PROPOFOL 25 MCG/KG/MIN: 10 INJECTION, EMULSION INTRAVENOUS at 11:03

## 2023-01-31 RX ADMIN — BUPRENORPHINE HYDROCHLORIDE 0.3 MG: 0.32 INJECTION INTRAMUSCULAR; INTRAVENOUS at 08:04

## 2023-01-31 RX ADMIN — ESMOLOL HYDROCHLORIDE 20 MG: 10 INJECTION, SOLUTION INTRAVENOUS at 10:55

## 2023-01-31 RX ADMIN — HYDROCODONE BITARTRATE AND ACETAMINOPHEN 1 TABLET: 7.5; 325 TABLET ORAL at 18:36

## 2023-01-31 RX ADMIN — FENTANYL CITRATE 50 MCG: 50 INJECTION, SOLUTION INTRAMUSCULAR; INTRAVENOUS at 11:51

## 2023-01-31 RX ADMIN — BUPIVACAINE HYDROCHLORIDE 30 ML: 2.5 INJECTION, SOLUTION EPIDURAL; INFILTRATION; INTRACAUDAL at 08:04

## 2023-01-31 RX ADMIN — ONDANSETRON 4 MG: 2 INJECTION INTRAMUSCULAR; INTRAVENOUS at 11:23

## 2023-01-31 RX ADMIN — DEXAMETHASONE SODIUM PHOSPHATE 2 MG: 10 INJECTION, SOLUTION INTRAMUSCULAR; INTRAVENOUS at 08:03

## 2023-01-31 RX ADMIN — LIDOCAINE HYDROCHLORIDE 50 MG: 10 INJECTION, SOLUTION EPIDURAL; INFILTRATION; INTRACAUDAL; PERINEURAL at 10:20

## 2023-01-31 RX ADMIN — ROCURONIUM BROMIDE 30 MG: 10 INJECTION INTRAVENOUS at 10:57

## 2023-01-31 RX ADMIN — ESMOLOL HYDROCHLORIDE 20 MG: 10 INJECTION, SOLUTION INTRAVENOUS at 10:20

## 2023-01-31 RX ADMIN — SODIUM CHLORIDE, POTASSIUM CHLORIDE, SODIUM LACTATE AND CALCIUM CHLORIDE 9 ML/HR: 600; 310; 30; 20 INJECTION, SOLUTION INTRAVENOUS at 08:13

## 2023-01-31 NOTE — ANESTHESIA POSTPROCEDURE EVALUATION
Patient: Zenon Sparrow    Procedure Summary     Date: 01/31/23 Room / Location:  [facility]  /  [facility]    Anesthesia Start: 1016 Anesthesia Stop: 1146    Procedure: PECTORAL MAJOR REPAIR - RIGHT (Right: Shoulder) Diagnosis:     Surgeons: Jose Castrejon Jr., MD Provider: Jon Bernard MD    Anesthesia Type: general with block ASA Status: 2          Anesthesia Type: general with block    Vitals  Vitals Value Taken Time   /129 01/31/23 1141   Temp     Pulse 115 01/31/23 1146   Resp     SpO2 92 % 01/31/23 1146   Vitals shown include unvalidated device data.        Post Anesthesia Care and Evaluation    Patient location during evaluation: PACU  Patient participation: complete - patient participated  Level of consciousness: awake and alert  Pain management: adequate    Airway patency: patent  Anesthetic complications: No anesthetic complications  PONV Status: none  Cardiovascular status: hemodynamically stable and acceptable  Respiratory status: nonlabored ventilation, acceptable and nasal cannula  Hydration status: acceptable

## 2023-01-31 NOTE — ANESTHESIA PROCEDURE NOTES
Airway  Urgency: elective    Date/Time: 1/31/2023 10:35 AM  Airway not difficult    General Information and Staff    Patient location during procedure: OR  CRNA/CAA: Nallely Tate CRNA  SRNA: Yuliya Huston SRNA  Indications and Patient Condition  Indications for airway management: airway protection    Preoxygenated: yes  MILS not maintained throughout  Mask difficulty assessment: 2 - vent by mask + OA or adjuvant +/- NMBA    Final Airway Details  Final airway type: endotracheal airway      Successful airway: ETT  Cuffed: yes   Successful intubation technique: direct laryngoscopy  Facilitating devices/methods: intubating stylet  Endotracheal tube insertion site: oral  Blade: Torrez  Blade size: 3  ETT size (mm): 7.5  Cormack-Lehane Classification: grade I - full view of glottis  Placement verified by: chest auscultation and capnometry   Measured from: lips  ETT/EBT  to lips (cm): 20  Number of attempts at approach: 1  Assessment: lips, teeth, and gum same as pre-op and atraumatic intubation    Additional Comments  Negative epigastric sounds, Breath sound equal bilaterally with symmetric chest rise and fall.Easy mask with oral airway, DL with Miller2, grade 2b view. Decision made to DL with Torrez, grade 1 view, ETT placed between VC, No ETCO2 noted, removed ETT and masked with oral airway, albuterol puffs given, Sevo 4%. Dr. Bernard @ bedside. Re-intubated with Torrez, grade 1 view, watched ETT pass through VC, Hand ventilated with ETCO2 and increase peak pressures, Equal bilateral breath sounds. On vent, Albuterol given and continued higher sevo concentrations until peak pressure decrease.

## 2023-01-31 NOTE — ANESTHESIA PREPROCEDURE EVALUATION
Anesthesia Evaluation     Patient summary reviewed and Nursing notes reviewed   no history of anesthetic complications:  NPO Solid Status: > 8 hours  NPO Liquid Status: > 2 hours           Airway   Mallampati: II  TM distance: >3 FB  Neck ROM: full  No difficulty expected  Dental - normal exam     Pulmonary    (+) a smoker Current Smoked day of surgery, COPD mild, decreased breath sounds, wheezes,   Cardiovascular   Exercise tolerance: good (4-7 METS)    ECG reviewed  Rhythm: regular  Rate: normal    (+) hypertension well controlled less than 2 medications,       Neuro/Psych  (+) headaches,    GI/Hepatic/Renal/Endo    (+) obesity,       Musculoskeletal     Abdominal   (+) obese,     Abdomen: soft.   Substance History      OB/GYN          Other   arthritis,                      Anesthesia Plan    ASA 2     general with block     intravenous induction     Anesthetic plan, risks, benefits, and alternatives have been provided, discussed and informed consent has been obtained with: patient.    Plan discussed with CRNA.        CODE STATUS:

## 2023-01-31 NOTE — ANESTHESIA PROCEDURE NOTES
Interscalene CATH      Patient reassessed immediately prior to procedure    Patient location during procedure: pre-op  Reason for block: at surgeon's request and post-op pain management  Performed by  CRNA/CAA: Rolando Gifford CRNA  Assisted by: Jolene Buchanan RN  Preanesthetic Checklist  Completed: patient identified, IV checked, site marked, risks and benefits discussed, surgical consent, monitors and equipment checked, pre-op evaluation and timeout performed  Prep:  Sterile barriers:cap, gloves, mask and washed/disinfected hands  Prep: ChloraPrep  Patient monitoring: blood pressure monitoring, continuous pulse oximetry and EKG  Procedure    Sedation: yes  Performed under: local infiltration  Guidance:ultrasound guided    ULTRASOUND INTERPRETATION.  Using ultrasound guidance a 20 G gauge needle was placed in close proximity to the nerve, at which point, under ultrasound guidance anesthetic was injected in the area of the nerve and spread of the anesthesia was seen on ultrasound in close proximity thereto.  There were no abnormalities seen on ultrasound; a digital image was taken; and the patient tolerated the procedure with no complications. Images:still images obtained, printed/placed on chart    Laterality:right  Block Type:interscalene  Injection Technique:catheter  Needle Type:Tuohy and echogenic  Needle Gauge:18 G  Resistance on Injection: none  Catheter Size:20 G (20g)  Cath Depth at skin: 10 cm    Medications Used: bupivacaine PF (MARCAINE) 0.25 % injection - Injection   15 mL - 1/31/2023 8:01:00 AM  fentaNYL citrate (PF) (SUBLIMAZE) injection - Intravenous   100 mcg - 1/31/2023 8:01:00 AM  midazolam (VERSED) injection - Intravenous   2 mg - 1/31/2023 8:01:00 AM      Post Assessment  Injection Assessment: negative aspiration for heme, no paresthesia on injection and incremental injection  Patient Tolerance:comfortable throughout block  Complications:no  Additional Notes  CATHETER  A high-frequency linear  "transducer, with sterile cover, was placed in the supraclavicular fossa to identify the subclavian artery and trunks and divisions of the brachial plexus. The transducer was then moved in a cephalad orientation with a slight rotation to continue visualization of the brachial plexus from the trunks and divisions, on to the C5-C7 roots. The insertion site was prepped and draped in sterile fashion. Skin and cutaneous tissue was infiltrated with 2-5 ml of 1% Lidocaine. Using ultrasound-guidance, an 18-gauge Contiplex Ultra 360 Touhy needle was advanced in plane from lateral to medial. Preservative-free normal saline was utilized for hydro-dissection of tissue, advancement of Touhy, and to confirm final needle placement at the fascial plane between the middle scalene muscle and sheath of the brachial plexus (C5-C7). A 20-gauge Contiplex Echo catheter was placed through the needle and advance out the tip of the Touhy 3-5 cm with the \"Mcghee Flip\". The Touhy needle was then removed, and final catheter position verified lateral to the brachial plexus with local anesthetic (LA) and ultrasound visualization. The catheter was secured in the usual fashion with skin glue, benzoin, steri-strips, CHG tegaderm and label noting \"Nerve Block Catheter\". Jerk tape applied at yellow connector and catheter connection. All LA was injected in increments of 3-5 ml after catheter secured. Aspiration every 5 ml to prevent intravascular injection. Injection was completed with negative aspiration of blood and negative intravascular injection. Injection pressures were normal with minimal resistance.           "

## 2023-01-31 NOTE — ANESTHESIA PROCEDURE NOTES
PECS 1 and 2      Patient reassessed immediately prior to procedure    Patient location during procedure: OR  Reason for block: at surgeon's request and post-op pain management  Performed by  CRNA/CAA: Rolando Gifford CRNA  Assisted by: Jolene Buchanan RN  Preanesthetic Checklist  Completed: patient identified, IV checked, site marked, risks and benefits discussed, surgical consent, monitors and equipment checked, pre-op evaluation and timeout performed  Prep:  Pt Position: supine  Sterile barriers:cap, gloves, mask and washed/disinfected hands  Prep: ChloraPrep  Patient monitoring: blood pressure monitoring, continuous pulse oximetry and EKG  Procedure  Performed under: general  Guidance:ultrasound guided and landmark technique  Images:still images obtained, printed/placed on chart    Laterality:right  Block Type:PECS I and PECS II  Injection Technique:single-shot  Needle Type:short-bevel  Needle Gauge:20 G  Resistance on Injection: none    Medications Used: bupivacaine PF (MARCAINE) 0.25 % injection - Injection   30 mL - 1/31/2023 8:04:00 AM  dexamethasone sodium phosphate injection - Injection   2 mg - 1/31/2023 8:03:00 AM  buprenorphine (BUPRENEX) injection - Injection   0.3 mg - 1/31/2023 8:04:00 AM      Medications  Preservative Free Saline:10ml  Comment:      Post Assessment  Injection Assessment: negative aspiration for heme, incremental injection and no paresthesia on injection  Patient Tolerance:comfortable throughout block  Complications:no  Additional Notes  Interpectoral-Pectoserratus Plane   A high-frequency linear transducer, with sterile cover, was placed medial to the coracoid process in the paramedian sagittal plane. The transducer was moved caudally to the 4th rib and rotated slightly to allow an in-plane needle trajectory from medial to lateral. Pectoralis Major Muscle (PMM), Pectoralis Minor Muscle (PmM), Thoracoacromial Artery, Ribs, and Pleura were identified under ultrasound. The insertion  "site was prepped in sterile fashion and then localized with 2-5 ml of 1% Lidocaine. Using ultrasound-guidance, a 20-gauge B-Hoffman 4\" Ultraplex 360 non-stimulating echogenic needle was advanced in plane until the tip of the needle was in the fascial plane between the PMM and PmM, lateral to the Thoracoacromial Artery. 1-3ml of preservative free normal saline was used to hydro-dissect the fascial planes. After the fascial plane was verified, 10ml local anesthetic (LA) was injected for Interpectoral fascial plane block. The needle was continued along the same path to the level of the 4th rib below PmM.  Initially preservative free normal saline was used to confirm needle position and then 20 ml of LA was injected for Pectoserratus fascial plane block. Aspiration every 5 ml to prevent intravascular injection. Injection was completed with negative aspiration of blood and negative intravascular injection. Injection pressures were normal with minimal resistance.             "

## 2023-02-01 VITALS
RESPIRATION RATE: 16 BRPM | HEIGHT: 71 IN | TEMPERATURE: 98.3 F | OXYGEN SATURATION: 90 % | BODY MASS INDEX: 32.87 KG/M2 | HEART RATE: 83 BPM | DIASTOLIC BLOOD PRESSURE: 80 MMHG | WEIGHT: 234.79 LBS | SYSTOLIC BLOOD PRESSURE: 115 MMHG

## 2023-02-01 PROBLEM — G89.18 ACUTE POSTOPERATIVE PAIN: Status: ACTIVE | Noted: 2023-02-01

## 2023-02-01 PROBLEM — Z98.890: Status: ACTIVE | Noted: 2023-02-01

## 2023-02-01 LAB
ANION GAP SERPL CALCULATED.3IONS-SCNC: 9 MMOL/L (ref 5–15)
BUN SERPL-MCNC: 11 MG/DL (ref 6–20)
BUN/CREAT SERPL: 20.4 (ref 7–25)
CALCIUM SPEC-SCNC: 9.1 MG/DL (ref 8.6–10.5)
CHLORIDE SERPL-SCNC: 102 MMOL/L (ref 98–107)
CO2 SERPL-SCNC: 27 MMOL/L (ref 22–29)
CREAT SERPL-MCNC: 0.54 MG/DL (ref 0.76–1.27)
EGFRCR SERPLBLD CKD-EPI 2021: 129.2 ML/MIN/1.73
GLUCOSE SERPL-MCNC: 98 MG/DL (ref 65–99)
POTASSIUM SERPL-SCNC: 4.5 MMOL/L (ref 3.5–5.2)
SODIUM SERPL-SCNC: 138 MMOL/L (ref 136–145)

## 2023-02-01 PROCEDURE — 94761 N-INVAS EAR/PLS OXIMETRY MLT: CPT

## 2023-02-01 PROCEDURE — 80048 BASIC METABOLIC PNL TOTAL CA: CPT | Performed by: ORTHOPAEDIC SURGERY

## 2023-02-01 PROCEDURE — 94799 UNLISTED PULMONARY SVC/PX: CPT

## 2023-02-01 PROCEDURE — 94664 DEMO&/EVAL PT USE INHALER: CPT

## 2023-02-01 RX ADMIN — Medication 2 G: at 01:39

## 2023-02-01 RX ADMIN — LISINOPRIL 10 MG: 10 TABLET ORAL at 08:02

## 2023-02-01 RX ADMIN — MELOXICAM 15 MG: 7.5 TABLET ORAL at 08:02

## 2023-02-01 RX ADMIN — IPRATROPIUM BROMIDE AND ALBUTEROL SULFATE 3 ML: .5; 3 SOLUTION RESPIRATORY (INHALATION) at 07:30

## 2023-02-01 RX ADMIN — DOCUSATE SODIUM 100 MG: 100 CAPSULE, LIQUID FILLED ORAL at 08:02

## 2023-02-01 NOTE — PROGRESS NOTES
Orthopedic Daily Progress Note      CC: DAMON overnight.    Pain well controlled  General: no fevers, chills  Abdomen: no nausea, vomiting, or diarrhea    No other complaints    Physical Exam:  I have reviewed the vital signs.  Temp:  [97 °F (36.1 °C)-99.1 °F (37.3 °C)] 98.3 °F (36.8 °C)  Heart Rate:  [] 83  Resp:  [12-18] 16  BP: ()/(60-92) 115/80    Objective  General Appearance:    Alert, cooperative, no distress  Extremities: No clubbing, cyanosis, or edema to lower extremities  Pulses:  2+ in distal surgical extremity  Skin: Dressing Clean/dry/intact      Results Review:    I have reviewed the labs, radiology results and diagnostic studies:yes        Results from last 7 days   Lab Units 02/01/23  0344   SODIUM mmol/L 138   POTASSIUM mmol/L 4.5   CO2 mmol/L 27.0   CREATININE mg/dL 0.54*   GLUCOSE mg/dL 98       I have reviewed the medications.    Assessment/Problem List  POD# 1 Day Post-Op   S/p R pec major repari    Plan  NWB RUE      Discharge Planning: I expect patient to be discharged to home in 0 days.    Jose Castrejon Jr., MD  02/01/23  11:36 EST

## 2023-02-01 NOTE — DISCHARGE SUMMARY
Patient Name: Zenon Sparrow  MRN: [id number]  : 1985  DOS: 2023    Attending: Jose Castrejon Jr., MD    Primary Care Provider: Provider, No Known    Date of Admission:.2023  6:53 AM    Date of Discharge:  2023    Discharge Diagnosis:   S/P repair of pectoralis muscle tear    Pectoral muscle rupture, initial encounter    Postoperative hypoxia    Tobacco abuse    Obesity    Acute postoperative pain      Hospital Course    At admit:    Patient is a pleasant 38 y.o. male presented for scheduled surgery by Dr. Castrejon.     Per Dr. 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  He has failed to improve with conservative measures including activity modifications and physical therapy, as well as anti-inflammatories.  Given his failure to improve with conservative measures over 6 months, we discussed the risk and benefits of performing a right pectoralis major tendon repair. The risks and benefits were discussed with the patient to include, but not limited to: bleeding, infection, nerve injury, failure of fixation, need for further procedures, loss of limb or life.  He understood these risks were higher given his smoking history and wished to proceed.).     Patient underwent pectoral major repair on the right, surgery was done under general anesthesia and a block and was tolerated well.     Postoperatively he was doing fairly well however noting that he has required bronchodilator treatment earlier due to bronchospasm he continued to require oxygen while in PACU.     Requiring 4 L oxygen his saturations were around 93%.  He did not have shortness of breath complaint.  No fever, chills, nausea or vomiting.     Chest x-ray done postoperatively showed right diaphragmatic elevation.     Peripheral nerve block catheter was turned off, location verified by ultrasound.  He was instructed on incentive spirometer use and  was monitored in PACU, was not able to wean off oxygen completely despite all efforts, it was felt best he be admitted and monitored in the hospital overnight.  Noting that he is mildly obese, has long history of tobacco abuse.     No diagnosis of sleep apnea.    After admit:    Patient was provided pain medications as needed for pain control, along with interscalene nerve block infusion of Ropivacaine.    Adjustments were made to pain medications to optimize postop pain management.   Risks and benefits of opiate medications discussed with patient.  Edvin report in chart was reviewed prior to discharge.    He had increased oxygen demands postop. He used an IS for atelectasis prophylaxis and frequent pulmonary toliet. He was weaned from supplemental oxygen and denies shortness of breath prior to discharge.    Home medications were resumed as appropriate, and labs were monitored and remained fairly stable.     With the progress he has made, he is ready for DC home today.      The patient will have an interscalene nerve block ( instructed on it during this admit)  Discussed with patient regarding plan and he shows understanding and agreement.        Procedures Performed    Pre-op Diagnosis:        Right pectoralis major rupture     Post-op Diagnosis:         Same     Procedure/CPT® Codes:  61281     Procedure(s):  PECTORAL MAJOR REPAIR - RIGHT     Staff:  Surgeon(s):  Jose Castrejon Jr., MD    Pertinent Test Results:    I reviewed the patient's new clinical results.     Results from last 7 days   Lab Units 02/01/23  0344   SODIUM mmol/L 138   POTASSIUM mmol/L 4.5   CHLORIDE mmol/L 102   CO2 mmol/L 27.0   BUN mg/dL 11   CREATININE mg/dL 0.54*   CALCIUM mg/dL 9.1   GLUCOSE mg/dL 98     I reviewed the patient's new imaging including images and reports.      Discharge Assessment:    Vital Signs  Visit Vitals  /80 (BP Location: Right arm, Patient Position: Lying)   Pulse 83   Temp 98.3 °F (36.8 °C) (Oral)   Resp  "16   Ht 180.3 cm (71\")   Wt 107 kg (234 lb 12.6 oz)   SpO2 90%   BMI 32.75 kg/m²     Temp (24hrs), Av.6 °F (36.4 °C), Min:97 °F (36.1 °C), Max:99.1 °F (37.3 °C)      General Appearance:    Alert, cooperative, in no acute distress   Lungs:     Clear to auscultation,respirations regular, even and   unlabored    Heart:    Regular rhythm and normal rate, normal S1 and S2    Abdomen:     Normal bowel sounds, no masses, no organomegaly, soft   non-tender, non-distended, no guarding, no rebound tenderness   Extremities:   RUE in a sling, CDI Prineo dressing. Interscalene nerve block cath present. Distal pulses, cap refill, movements of fingers, wrist, intact.   Pulses:   Pulses palpable and equal bilaterally   Skin:   No bleeding, bruising or rash   Neurologic:   Cranial nerves 2 - 12 grossly intact       Discharge Disposition: Home          Discharge Medications      New Medications      Instructions Start Date   HYDROcodone-acetaminophen 7.5-325 MG per tablet  Commonly known as: NORCO   Take 1-2 tablet(s) by mouth Every 4 (Four) Hours As Needed for Moderate Pain.      ondansetron 4 MG tablet  Commonly known as: Zofran   4 mg, Oral, Every 8 Hours PRN      Stool Softener 100 MG capsule  Generic drug: docusate sodium   100 mg, Oral, 2 Times Daily             Discharge Diet: Regular diet      Activity at Discharge:   Activity Instructions     Non-Weight Bearing - Specify Restrictions      Patient May Shower; No Tub Baths, Pools or Hot Tubs            Follow-up Appointments  Dr. Castrejon per his orders      RUKHSANA Kwan  23  10:29 EST  "

## 2023-02-01 NOTE — PLAN OF CARE
Goal Outcome Evaluation:  Plan of Care Reviewed With: patient        Progress: improving  Outcome Evaluation: Patient being discharged home with family. IV was removed. Patients mother took prescribed medications home yesterday. Discharge instructions discussed with patient and patient verbalized understanding. Ice packs and replacements given to patient to take home.

## 2023-02-01 NOTE — PROGRESS NOTES
[facility]    Acute pain service Inpatient Progress Note    Patient Name: Zenon Sparrow  :  1985  MRN:  [id number]        Acute Pain  Service Inpatient Progress Note:    Analgesia:Good  Pain Score:3/10  LOC: alert and awake  Resp Status: room air  Cardiac: VS stable  Side Effects:None  Catheter Site:clean, dressing intact and dry  Cath type: peripheral nerve cath with ON Q  Volume: 1mL,5ml, 5ml InfuSystem Pump.  Catheter Plan:Catheter to remain Insitu and Continue catheter infusion rate unchanged

## 2023-02-01 NOTE — PLAN OF CARE
Goal Outcome Evaluation:  Plan of Care Reviewed With: patient           Outcome Evaluation: VSS. Pt placed on 2L nasal cannual while asleep. Pt ambulating to bathroom with no difficulty. Will continue to monitor.

## 2023-02-01 NOTE — CASE MANAGEMENT/SOCIAL WORK
Case Management Discharge Note      Final Note: outpatient status: Home today and no discharge needs noted.         Selected Continued Care - Discharged on 2/1/2023 Admission date: 1/31/2023 - Discharge disposition: Home or Self Care    Destination    No services have been selected for the patient.              Durable Medical Equipment    No services have been selected for the patient.              Dialysis/Infusion    No services have been selected for the patient.              Home Medical Care    No services have been selected for the patient.              Therapy    No services have been selected for the patient.              Community Resources    No services have been selected for the patient.              Community & DME    No services have been selected for the patient.                       Final Discharge Disposition Code: 01 - home or self-care

## (undated) DEVICE — BLANKT WARM LOWR/BDY 100X120CM

## (undated) DEVICE — TBG PENCL TELESCP MEGADYNE SMOKE EVAC 10FT

## (undated) DEVICE — NDL SUT NEEDLELOOP FREE MO/6 PK/12

## (undated) DEVICE — PATIENT RETURN ELECTRODE, SINGLE-USE, CONTACT QUALITY MONITORING, ADULT, WITH 9FT CORD, FOR PATIENTS WEIGING OVER 33LBS. (15KG): Brand: MEGADYNE

## (undated) DEVICE — ARM SLING: Brand: DEROYAL

## (undated) DEVICE — ELECTRD BLD EZ CLN STD 6.5IN

## (undated) DEVICE — INTENT TO BE USED WITH SUTURE MATERIAL FOR TISSUE CLOSURE: Brand: RICHARD-ALLAN® NEEDLE 1/2 CIRCLE TAPER

## (undated) DEVICE — GLV SURG SENSICARE PI LF PF 7.0

## (undated) DEVICE — 4.0MM OVAL SOLID CARBIDE BUR LONG

## (undated) DEVICE — GLV SURG PREMIERPRO GAMMEX NEOPRN PF SZ8 GRN

## (undated) DEVICE — SUT PROLN 4/0 PC3 BL 8634G

## (undated) DEVICE — ANTIBACTERIAL UNDYED BRAIDED (POLYGLACTIN 910), SYNTHETIC ABSORBABLE SUTURE: Brand: COATED VICRYL

## (undated) DEVICE — COVER,MAYO STAND,STERILE: Brand: MEDLINE

## (undated) DEVICE — 3M™ STERI-DRAPE™ U-DRAPE 1015: Brand: STERI-DRAPE™

## (undated) DEVICE — TRAP FLD MINIVAC MEGADYNE 100ML

## (undated) DEVICE — GLV SURG PREMIERPRO MIC LTX PF SZ8 BRN

## (undated) DEVICE — GLV SURG SENSICARE PI LF PF 6.5

## (undated) DEVICE — DRSNG SURG AQUACEL AG/ADVNTGE 9X25CM 3.5X10IN

## (undated) DEVICE — PK MAJ SHLDR SPLT 10

## (undated) DEVICE — T-MAX DISPOSABLE FACE MASK 8 PER BOX